# Patient Record
Sex: FEMALE | Race: WHITE
[De-identification: names, ages, dates, MRNs, and addresses within clinical notes are randomized per-mention and may not be internally consistent; named-entity substitution may affect disease eponyms.]

---

## 2019-10-14 ENCOUNTER — HOSPITAL ENCOUNTER (OUTPATIENT)
Dept: HOSPITAL 61 - US | Age: 69
End: 2019-10-14
Attending: SURGERY
Payer: COMMERCIAL

## 2019-10-14 DIAGNOSIS — N63.10: Primary | ICD-10-CM

## 2019-10-14 DIAGNOSIS — N60.11: ICD-10-CM

## 2019-10-14 DIAGNOSIS — D24.1: ICD-10-CM

## 2019-10-14 PROCEDURE — 19084 BX BREAST ADD LESION US IMAG: CPT

## 2019-10-14 PROCEDURE — 88305 TISSUE EXAM BY PATHOLOGIST: CPT

## 2019-10-14 PROCEDURE — 19083 BX BREAST 1ST LESION US IMAG: CPT

## 2019-10-14 PROCEDURE — C1713 ANCHOR/SCREW BN/BN,TIS/BN: HCPCS

## 2019-10-14 PROCEDURE — 76942 ECHO GUIDE FOR BIOPSY: CPT

## 2019-10-14 PROCEDURE — 77065 DX MAMMO INCL CAD UNI: CPT

## 2019-10-15 NOTE — PATHOLOGY
[facility] Accession Number: [id number]

.                                                                01

Material submitted:                                        .

PART A: breast - RIGHT BREAST NODULE 3:00 RETROAREOLAR. Modifiers: right,

3:00

PART B: breast - RIGHT BREAST NODULE 9:00 RETROAREOLAR. Modifiers: right,

9:00

.                                                                01

Clinical history:                                          .

Right breast mass

Right breast mass in duct x2

.                                                                02

**********************************************************************

Diagnosis:

A. Breast biopsy "right breast nodule 3:00 retroareolar":

- Intraductal papilloma and fibroadenomatous nodule with dystrophic

calcification.

- There is no evidence of malignancy.

.

B. Breast biopsy "right breast nodule 9:00 retroareolar:

- Fibrocystic changes.

- There is no evidence of atypia or malignancy.

(SHA/db; 10/15/2019)

[facility] [place]  10/15/2019  1311 Local

**********************************************************************

.                                                                02

Comment:

This case is also reviewed by Dr. Lan Chu.

(SHA:gilles; 10/15/2019)

.                                                                02

Electronically signed:                                     .

Vito Brian MD, Pathologist

NPI- [id number]

.                                                                01

Gross description:                                         .

A. The specimen is received in formalin, labeled "Bere Jaime, right

breast 3:00 (retroareolar per requisition)" and consists of 3 needle cores

of yellow-pink fibroadipose tissue measuring between 1.4 cm and 1.5 cm in

length and 0.1-0.2 cm in diameter which are entirely submitted in A1-A2.

The specimen was obtained at 10:11 AM on 10/14/2019 and placed in formalin

at 10:11 AM.  The cold ischemic time is less than 1 minute and the total

formalin fixation time is greater than 6 hours less than 72 hours.

.

B. The specimen is received in formalin, labeled "Bere Jaime, right

breast 9:00 (retroareolar per requisition)" and consists of 3 needle cores

of yellow-pink fibroadipose tissue measuring between 0.5 cm and 1.5 cm in

length and 0.2 cm each in diameter.  They are entirely submitted in B1-B2.

The specimen was obtained at 10:31 AM on 10/14/2019 and placed in formalin

at 10:31 AM.  The cold ischemic time is less than 1 minute and the total

formalin position time is greater than 6 hours but less than 72 hours.

(SDY; 10/14/2019)

SYU/SYU  10/14/2019  1644 Local

.                                                                02

Microscopic:                                                    .

.

.                                                                02

Pathologist provided ICD-10:

D24.1, N60.11

.                                                                02

CPT                                                        .

017908, [id number]

Specimen Comment: A courtesy copy of this report has been sent to

Specimen Comment: [phone], [phone], [phone].

Specimen Comment: Report sent to ,DR NAM / DR CAMARENA

***Performed at:  01

   [facility]

   [address], [place]  [id number]

   MD Johann Guzman MD Phone:  [phone]

***Performed at:  02

   [facility]

   [address], [place]  [id number]

   MD Alex Jacinto MD Phone:  [phone]

## 2019-10-16 NOTE — RAD
Examination: US GUID NDL PLACE/ASPI/BX, US GUID NDL PLACE/ASPI/BX, DIGITAL

DIAGNOSTIC RT



History: Right breast masses



Comparison/Correlation: 9/20/2019 bilateral diagnostic mammographic exam,

9/20/2019 Limited right breast ultrasound exam



Findings: Risks and benefits of ultrasound-guided core biopsies with clip

marker placement were discussed with the patient and informed consent was

obtained.



Preliminary ultrasound imaging was performed. Cleansing with ChloraPrep was

performed. Sterile gel and sterile probe cover were utilized.

Sterile drapes were placed.



Medial approach was utilized to biopsy the right breast 3:00 mass.

Approximately 5 cc 1% lidocaine was administered. Scalpel incision was made.

An introducer was placed. 14-gauge core biopsy needle was utilized with the

bevel exposed and 3 passes were made. Samples were placed in the specimen jar.

Clip marker was placed.



Lateral approach was utilized to biopsy the right breast 9:00 intraductal

mass.  Approximately 5 cc 1% lidocaine was administered. Scalpel incision was

made. An introducer was placed. 14-gauge core biopsy needle was utilized with

the bevel exposed and 3 passes were made. Samples were placed in the specimen

jar. Clip marker was placed.



 The patient tolerated procedure well without immediate complications.



Right breast MLO and CC images were acquired. Clip marker is evident at the

right upper outer aspect near the nipple and at the left lower inner aspect of

the nipple. Heterogeneously dense breast parenchyma is seen. No loculated

hematoma collection.





Impression:

Successful biopsy of the right breast 9:00 and 3:00 masses.